# Patient Record
Sex: MALE | Race: WHITE | HISPANIC OR LATINO | Employment: OTHER | URBAN - METROPOLITAN AREA
[De-identification: names, ages, dates, MRNs, and addresses within clinical notes are randomized per-mention and may not be internally consistent; named-entity substitution may affect disease eponyms.]

---

## 2023-05-12 ENCOUNTER — OFFICE VISIT (OUTPATIENT)
Dept: FAMILY MEDICINE CLINIC | Facility: CLINIC | Age: 33
End: 2023-05-12
Payer: COMMERCIAL

## 2023-05-12 ENCOUNTER — LAB (OUTPATIENT)
Dept: LAB | Facility: HOSPITAL | Age: 33
End: 2023-05-12
Payer: COMMERCIAL

## 2023-05-12 VITALS
SYSTOLIC BLOOD PRESSURE: 126 MMHG | BODY MASS INDEX: 30.77 KG/M2 | WEIGHT: 227.2 LBS | HEIGHT: 72 IN | DIASTOLIC BLOOD PRESSURE: 92 MMHG | HEART RATE: 72 BPM | TEMPERATURE: 98.7 F | OXYGEN SATURATION: 96 %

## 2023-05-12 DIAGNOSIS — N43.3 HYDROCELE, UNSPECIFIED HYDROCELE TYPE: ICD-10-CM

## 2023-05-12 DIAGNOSIS — Z31.41 FERTILITY TESTING: ICD-10-CM

## 2023-05-12 DIAGNOSIS — J30.1 ALLERGIC RHINITIS DUE TO POLLEN, UNSPECIFIED SEASONALITY: ICD-10-CM

## 2023-05-12 DIAGNOSIS — Z98.890 HISTORY OF HYDROCELECTOMY: ICD-10-CM

## 2023-05-12 DIAGNOSIS — Z00.00 ENCOUNTER FOR SCREENING AND PREVENTATIVE CARE: Primary | ICD-10-CM

## 2023-05-12 LAB
DEPRECATED RDW RBC AUTO: 40.3 FL (ref 37–54)
ERYTHROCYTE [DISTWIDTH] IN BLOOD BY AUTOMATED COUNT: 12.8 % (ref 12.3–15.4)
HCT VFR BLD AUTO: 47.3 % (ref 37.5–51)
HGB BLD-MCNC: 15.6 G/DL (ref 13–17.7)
MCH RBC QN AUTO: 29.1 PG (ref 26.6–33)
MCHC RBC AUTO-ENTMCNC: 33 G/DL (ref 31.5–35.7)
MCV RBC AUTO: 88.2 FL (ref 79–97)
PLATELET # BLD AUTO: 329 10*3/MM3 (ref 140–450)
PMV BLD AUTO: 10.7 FL (ref 6–12)
RBC # BLD AUTO: 5.36 10*6/MM3 (ref 4.14–5.8)
T4 FREE SERPL-MCNC: 1.01 NG/DL (ref 0.93–1.7)
TSH SERPL DL<=0.05 MIU/L-ACNC: 1.89 UIU/ML (ref 0.27–4.2)
WBC NRBC COR # BLD: 14 10*3/MM3 (ref 3.4–10.8)

## 2023-05-12 PROCEDURE — 84439 ASSAY OF FREE THYROXINE: CPT | Performed by: PHYSICIAN ASSISTANT

## 2023-05-12 PROCEDURE — 85027 COMPLETE CBC AUTOMATED: CPT | Performed by: PHYSICIAN ASSISTANT

## 2023-05-12 PROCEDURE — 36415 COLL VENOUS BLD VENIPUNCTURE: CPT | Performed by: PHYSICIAN ASSISTANT

## 2023-05-12 PROCEDURE — 80061 LIPID PANEL: CPT | Performed by: PHYSICIAN ASSISTANT

## 2023-05-12 PROCEDURE — 80053 COMPREHEN METABOLIC PANEL: CPT | Performed by: PHYSICIAN ASSISTANT

## 2023-05-12 PROCEDURE — 99204 OFFICE O/P NEW MOD 45 MIN: CPT | Performed by: PHYSICIAN ASSISTANT

## 2023-05-12 PROCEDURE — 84443 ASSAY THYROID STIM HORMONE: CPT | Performed by: PHYSICIAN ASSISTANT

## 2023-05-12 RX ORDER — LEVOCETIRIZINE DIHYDROCHLORIDE 5 MG/1
5 TABLET, FILM COATED ORAL EVERY EVENING
Qty: 30 TABLET | Refills: 5 | Status: SHIPPED | OUTPATIENT
Start: 2023-05-12

## 2023-05-12 NOTE — PROGRESS NOTES
New Patient Office Visit      Date: 2023   Patient Name: Panchito Caruso  : 1990   MRN: [id number]     Chief Complaint:    Chief Complaint   Patient presents with   • Establish Care   • Cough     Pt feels short of breath, no fever, no body aches, chest feels full and kinds tight, nasal congestion, cough is productive but not sure if its only the stuff draining into throat not the stuff in the chest  Started 3 days       History of Present Illness: Panchito Caruso is a 33 y.o. male who is here today to establish care.  [place] telemetry  used.  He has had some congestion and cough of clear sputum.  Some postnasal drainage is clear.  No fever, no wheezing no other problems.  He tells me he has a history of a hydrocele which she had surgically removed but moved before he can get a follow-up on this.  He and his wife are concerned about fertility issues and he would like to see a urologist to follow-up on the hydrocele and fertility.    Subjective      Review of Systems:   Review of Systems     Past Medical History: History reviewed. No pertinent past medical history.    Past Surgical History: History reviewed. No pertinent surgical history.    Family History: History reviewed. No pertinent family history.    Social History:   Social History     Socioeconomic History   • Marital status:    Tobacco Use   • Smoking status: Never     Passive exposure: Never   • Smokeless tobacco: Never   Substance and Sexual Activity   • Alcohol use: Never   • Drug use: Never   • Sexual activity: Yes     Partners: Male       Medications:     Current Outpatient Medications:   •  levocetirizine (XYZAL) 5 MG tablet, Take 1 tablet by mouth Every Evening., Disp: 30 tablet, Rfl: 5    Allergies:   No Known Allergies    Objective     Vital Signs:   Vitals:    23 1341   BP: 126/92   Pulse: 72   Temp: 98.7 °F (37.1 °C)   TempSrc: Infrared   SpO2: 96%   Weight: 103 kg (227 lb 3.2 oz)   Height: 184 cm  "(72.44\")     Body mass index is 30.44 kg/m².   BMI is >= 30 and <35. (Class 1 Obesity). The following options were offered after discussion;: weight loss educational material (shared in after visit summary)      Physical Exam:   Physical Exam  Vitals and nursing note reviewed.   Constitutional:       General: He is not in acute distress.     Appearance: Normal appearance. He is well-developed.   HENT:      Head: Normocephalic and atraumatic.      Right Ear: Tympanic membrane and ear canal normal. There is no impacted cerumen.      Left Ear: Tympanic membrane and ear canal normal. There is no impacted cerumen.      Nose: Congestion and rhinorrhea present. Rhinorrhea is clear.      Mouth/Throat:      Mouth: Mucous membranes are moist.      Pharynx: Oropharynx is clear. No oropharyngeal exudate or posterior oropharyngeal erythema.   Eyes:      General: No scleral icterus.        Right eye: No discharge.         Left eye: No discharge.      Extraocular Movements: Extraocular movements intact.      Conjunctiva/sclera: Conjunctivae normal.      Pupils: Pupils are equal, round, and reactive to light.   Neck:      Thyroid: No thyromegaly.      Vascular: No carotid bruit.   Cardiovascular:      Rate and Rhythm: Normal rate and regular rhythm.      Heart sounds: Normal heart sounds. No murmur heard.  Pulmonary:      Breath sounds: Normal breath sounds. No wheezing, rhonchi or rales.   Abdominal:      General: Bowel sounds are normal. There is no distension.      Palpations: Abdomen is soft. There is no mass.      Tenderness: There is no abdominal tenderness.   Musculoskeletal:         General: No swelling. Normal range of motion.      Cervical back: Normal range of motion and neck supple.      Right lower leg: No edema.      Left lower leg: No edema.   Lymphadenopathy:      Cervical: No cervical adenopathy.   Skin:     General: Skin is warm.      Coloration: Skin is not jaundiced or pale.      Findings: No bruising or rash. "   Neurological:      General: No focal deficit present.      Mental Status: He is alert.      Cranial Nerves: No cranial nerve deficit.      Motor: No weakness.      Gait: Gait normal.   Psychiatric:         Mood and Affect: Mood normal.         Behavior: Behavior normal.         Judgment: Judgment normal.          Procedures     Assessment / Plan      Assessment/Plan:   Diagnoses and all orders for this visit:    1. Encounter for screening and preventative care (Primary)  -     Comprehensive metabolic panel; Future  -     CBC No Differential; Future  -     Lipid panel; Future  -     T4, free; Future  -     TSH; Future  -     Comprehensive metabolic panel  -     CBC No Differential  -     Lipid panel  -     T4, free  -     TSH    2. Allergic rhinitis due to pollen, unspecified seasonality  -     levocetirizine (XYZAL) 5 MG tablet; Take 1 tablet by mouth Every Evening.  Dispense: 30 tablet; Refill: 5    3. Hydrocele, unspecified hydrocele type    4. History of hydrocelectomy  -     Ambulatory Referral to Urology    5. Fertility testing  -     Ambulatory Referral to Urology         1. Labs today.  Refer to urology for follow-up on hydrocele  excision and fertility.      Follow Up:   No follow-ups on file.    Flakita Mendiola PA-C   [facility] [place] [facility] [place]

## 2023-05-13 LAB
ALBUMIN SERPL-MCNC: 4.2 G/DL (ref 3.5–5.2)
ALBUMIN/GLOB SERPL: 1.3 G/DL
ALP SERPL-CCNC: 79 U/L (ref 39–117)
ALT SERPL W P-5'-P-CCNC: 22 U/L (ref 1–41)
ANION GAP SERPL CALCULATED.3IONS-SCNC: 9.9 MMOL/L (ref 5–15)
AST SERPL-CCNC: 21 U/L (ref 1–40)
BILIRUB SERPL-MCNC: 0.4 MG/DL (ref 0–1.2)
BUN SERPL-MCNC: 13 MG/DL (ref 6–20)
BUN/CREAT SERPL: 10.8 (ref 7–25)
CALCIUM SPEC-SCNC: 9.5 MG/DL (ref 8.6–10.5)
CHLORIDE SERPL-SCNC: 102 MMOL/L (ref 98–107)
CHOLEST SERPL-MCNC: 204 MG/DL (ref 0–200)
CO2 SERPL-SCNC: 27.1 MMOL/L (ref 22–29)
CREAT SERPL-MCNC: 1.2 MG/DL (ref 0.76–1.27)
EGFRCR SERPLBLD CKD-EPI 2021: 82.9 ML/MIN/1.73
GLOBULIN UR ELPH-MCNC: 3.2 GM/DL
GLUCOSE SERPL-MCNC: 75 MG/DL (ref 65–99)
HDLC SERPL-MCNC: 41 MG/DL (ref 40–60)
LDLC SERPL CALC-MCNC: 140 MG/DL (ref 0–100)
LDLC/HDLC SERPL: 3.37 {RATIO}
POTASSIUM SERPL-SCNC: 4.4 MMOL/L (ref 3.5–5.2)
PROT SERPL-MCNC: 7.4 G/DL (ref 6–8.5)
SODIUM SERPL-SCNC: 139 MMOL/L (ref 136–145)
TRIGL SERPL-MCNC: 125 MG/DL (ref 0–150)
VLDLC SERPL-MCNC: 23 MG/DL (ref 5–40)

## 2023-05-15 ENCOUNTER — TELEPHONE (OUTPATIENT)
Dept: FAMILY MEDICINE CLINIC | Facility: CLINIC | Age: 33
End: 2023-05-15
Payer: COMMERCIAL

## 2023-05-15 DIAGNOSIS — N43.3 HYDROCELE IN ADULT: Primary | ICD-10-CM

## 2023-05-15 NOTE — TELEPHONE ENCOUNTER
URBANO WITH UROLOGY CALL REQUESTING SEMEN ANALYSIS AND ULTRASOUND DONE BEFORE THEY CAN SEE HIM.  AFTER TEST ARE DONE THE WILL NEED TO BE FAXED [phone]. THEY WILL THEN SCHEDULE PATIENT.

## 2023-05-15 NOTE — TELEPHONE ENCOUNTER
I will order semen analysis and scrotal US, please let patient know urology requested these things before they see him.

## 2023-06-13 ENCOUNTER — HOSPITAL ENCOUNTER (OUTPATIENT)
Dept: ULTRASOUND IMAGING | Facility: HOSPITAL | Age: 33
Discharge: HOME | End: 2023-06-13
Admitting: PHYSICIAN ASSISTANT
Payer: COMMERCIAL

## 2023-06-13 DIAGNOSIS — N43.3 HYDROCELE IN ADULT: ICD-10-CM

## 2023-06-13 PROCEDURE — 76870 US EXAM SCROTUM: CPT

## 2023-08-03 ENCOUNTER — TELEPHONE (OUTPATIENT)
Dept: FAMILY MEDICINE CLINIC | Facility: CLINIC | Age: 33
End: 2023-08-03

## 2023-08-03 NOTE — TELEPHONE ENCOUNTER
Caller: TETOBONILLA    Relationship: Emergency Contact    Best call back number: [phone]        What test was performed: ULTRASOUND OF TESTICLES    When was the test performed: IN JUNE 2023    Where was the test performed: [facility]     Additional notes: THE CALLER STATES THAT THEY HAVE NOT RECEIVED THE RESULTS YET PLEASE CALL THE CALLER TO LET HER KNOW IF THE RESULTS ARE IN

## 2023-08-05 NOTE — TELEPHONE ENCOUNTER
I called and spoke with Deann and let her know to call urology and get patient in. I read the results word for word from the letter written by provider via .

## 2023-08-07 ENCOUNTER — TELEPHONE (OUTPATIENT)
Dept: FAMILY MEDICINE CLINIC | Facility: CLINIC | Age: 33
End: 2023-08-07

## 2023-08-07 NOTE — TELEPHONE ENCOUNTER
Caller:  SURGERY    Relationship:     Best call back number: [phone]    What orders are you requesting (i.e. lab or imaging): LAB    In what timeframe would the patient need to come in: ASAP    Where will you receive your lab/imaging services:  LAB    Additional notes: PATIENT NEEDS NEW SEMEN ANALYSIS ORDER PLEASE AND THANK YOU. THIS IS REQUIRED FOR SURGERY

## 2023-08-08 DIAGNOSIS — N43.3 HYDROCELE IN ADULT: Primary | ICD-10-CM

## 2023-08-15 ENCOUNTER — OFFICE VISIT (OUTPATIENT)
Dept: FAMILY MEDICINE CLINIC | Facility: CLINIC | Age: 33
End: 2023-08-15
Payer: COMMERCIAL

## 2023-08-15 ENCOUNTER — LAB (OUTPATIENT)
Dept: LAB | Facility: HOSPITAL | Age: 33
End: 2023-08-15
Payer: COMMERCIAL

## 2023-08-15 VITALS
BODY MASS INDEX: 30.75 KG/M2 | HEIGHT: 72 IN | TEMPERATURE: 98.2 F | DIASTOLIC BLOOD PRESSURE: 70 MMHG | HEART RATE: 62 BPM | WEIGHT: 227 LBS | OXYGEN SATURATION: 97 % | SYSTOLIC BLOOD PRESSURE: 110 MMHG

## 2023-08-15 DIAGNOSIS — N43.3 HYDROCELE IN ADULT: Primary | ICD-10-CM

## 2023-08-15 DIAGNOSIS — Z98.890 HISTORY OF HYDROCELECTOMY: ICD-10-CM

## 2023-08-15 DIAGNOSIS — Z31.41 FERTILITY TESTING: ICD-10-CM

## 2023-08-15 DIAGNOSIS — N43.3 HYDROCELE IN ADULT: ICD-10-CM

## 2023-08-15 PROCEDURE — 36415 COLL VENOUS BLD VENIPUNCTURE: CPT

## 2023-08-15 PROCEDURE — 84403 ASSAY OF TOTAL TESTOSTERONE: CPT

## 2023-08-15 PROCEDURE — 99213 OFFICE O/P EST LOW 20 MIN: CPT | Performed by: PHYSICIAN ASSISTANT

## 2023-08-15 PROCEDURE — 84402 ASSAY OF FREE TESTOSTERONE: CPT

## 2023-08-15 NOTE — PROGRESS NOTES
"     Follow Up Office Visit      Date: 08/15/2023   Patient Name: Panchito Caruso  : 1990   MRN: [id number]     Chief Complaint:    Chief Complaint   Patient presents with    Follow-up     Had surgery 1 and 2 years ago. Wants semen tested or testosterone level.     Tereza [id number] used this visit.  History of Present Illness: Panchito Caruso is a 33 y.o. male who is here today to follow up with needing some laboratory testing.  Couple years ago he had surgery for hydrocele of his testicle.  He has been trying to get into see urology due to development of a hydrocele and varicocele on the other side.  Urology wants him to have semen testing and testosterone level before he has an appointment with them.  He has some fertility concerns and that is why they want the semen exam.  He denies any pain or swelling.  No other concerns today.      Subjective      Review of systems:  Review of Systems     I have reviewed and the following portions of the patient's history were updated as appropriate: past family history, past medical history, past social history, past surgical history and problem list.    Medications:   No current outpatient medications on file.    Allergies:   Allergies   Allergen Reactions    Penicillins Anaphylaxis       Objective     Vital Signs:   Vitals:    08/15/23 0940   BP: 110/70   Pulse: 62   Temp: 98.2 øF (36.8 øC)   SpO2: 97%   Weight: 103 kg (227 lb)   Height: 184 cm (72.44\")     Body mass index is 30.41 kg/mý.          Physical Exam:   Physical Exam  Vitals and nursing note reviewed.   Constitutional:       Appearance: Normal appearance.   Cardiovascular:      Rate and Rhythm: Normal rate and regular rhythm.   Pulmonary:      Effort: Pulmonary effort is normal.      Breath sounds: Normal breath sounds.   Musculoskeletal:      Cervical back: Neck supple.      Right lower leg: No edema.      Left lower leg: No edema.   Neurological:      Mental Status: He is alert.    "     Procedures     Assessment / Plan      Assessment/Plan:   Diagnoses and all orders for this visit:    1. Hydrocele in adult (Primary)  -     Testosterone, Free, Total; Future  -     Semen Analysis - Semen, Voided; Future    2. History of hydrocelectomy    3. Fertility testing  -     Semen Analysis - Semen, Voided; Future    We will order the requested testing for urology.    Follow Up:   No follow-ups on file.    Flakita Mendiola PA-C   [facility] [place]

## 2023-08-22 LAB
TESTOST FREE SERPL-MCNC: 10 PG/ML (ref 8.7–25.1)
TESTOST SERPL-MCNC: 298 NG/DL (ref 264–916)